# Patient Record
Sex: FEMALE
[De-identification: names, ages, dates, MRNs, and addresses within clinical notes are randomized per-mention and may not be internally consistent; named-entity substitution may affect disease eponyms.]

---

## 2018-02-05 ENCOUNTER — HOSPITAL ENCOUNTER (EMERGENCY)
Dept: HOSPITAL 88 - ER | Age: 63
Discharge: HOME | End: 2018-02-05
Payer: SELF-PAY

## 2018-02-05 VITALS — HEIGHT: 60 IN | WEIGHT: 190 LBS | BODY MASS INDEX: 37.3 KG/M2

## 2018-02-05 DIAGNOSIS — S52.355A: Primary | ICD-10-CM

## 2018-02-05 DIAGNOSIS — M25.562: ICD-10-CM
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
## 2018-02-05 DIAGNOSIS — S52.612A: ICD-10-CM

## 2018-02-05 DIAGNOSIS — S80.02XA: ICD-10-CM

## 2018-02-05 PROCEDURE — 99283 EMERGENCY DEPT VISIT LOW MDM: CPT

## 2018-02-05 NOTE — DIAGNOSTIC IMAGING REPORT
PROCEDURE:CT OF LT KNEE WO CONTRAST

 

COMPARISON:None.

 

INDICATIONS:FALL LEFT KNEE INJURY

 

FINDINGS:

No fracture or dislocation of the tibia, fibula, or patella.

Mild tricompartmental osteoarthritis of the left knee.

A well-corticated ossicle within the knee joint (series 5, image 40) 

may represent a loose body.

There is a small knee joint effusion.

 

CONCLUSION:

 

Mild degenerative changes of the left knee.

No fracture or dislocation. 

 

Dictated by:  Yariel Anaya M.D. on 2/05/2018 at 16:21     

Electronically approved by:  Yariel Anaya M.D. on 2/05/2018 at 16:21

## 2018-02-05 NOTE — DIAGNOSTIC IMAGING REPORT
PROCEDURE:X-RAY LEFT FOREARM, TWO VIEWS

 

COMPARISON:None.

 

INDICATIONS:FALL

 

FINDINGS:

2 views of the left forearm (AP and lateral).

 

Fractures of the distal radius and ulna are described on dictation for 

wrist radiograph.

No additional fractures of the left forearm.

There is soft tissue swelling about the left wrist.

 

CONCLUSION:

Distal radial and ulnar fractures.

 

Dictated by:  Yariel Anaya M.D. on 2/05/2018 at 15:34     

Electronically approved by:  Yraiel Anaya M.D. on 2/05/2018 at 15:34

## 2018-02-05 NOTE — DIAGNOSTIC IMAGING REPORT
PROCEDURE:X-RAY LEFT WRIST, COMPLETE

 

COMPARISON:None.

 

INDICATIONS:FALL

 

FINDINGS:

3 views of the left wrist (AP, lateral, and oblique)

 

Comminuted, transverse fracture through the distal radial metaphysis. 

There is dorsal displacement of the distal fracture fragment.

Avulsion fracture of the ulnar styloid.

Soft tissue swelling about the left wrist.

 

CONCLUSION:

Distal radial and ulnar fractures as above.

 

Dictated by:  Yariel Anaya M.D. on 2/05/2018 at 15:32     

Electronically approved by:  Yariel Anaya M.D. on 2/05/2018 at 15:32